# Patient Record
Sex: FEMALE | Race: OTHER | NOT HISPANIC OR LATINO | URBAN - METROPOLITAN AREA
[De-identification: names, ages, dates, MRNs, and addresses within clinical notes are randomized per-mention and may not be internally consistent; named-entity substitution may affect disease eponyms.]

---

## 2021-02-23 ENCOUNTER — OFFICE VISIT (OUTPATIENT)
Dept: URBAN - METROPOLITAN AREA CLINIC 7 | Facility: CLINIC | Age: 66
End: 2021-02-23

## 2021-03-13 ENCOUNTER — LAB OUTSIDE AN ENCOUNTER (OUTPATIENT)
Age: 66
End: 2021-03-13

## 2021-03-20 LAB
CLASS: 0
GLIADIN (DEAMIDATED) AB (IGG): (no result)
HELICOBACTER PYLORI, UREA BREATH TEST: NOT DETECTED
IMMUNOGLOBULIN A: (no result)
INTERPRETATION: (no result)
TISSUE TRANSGLUTAMINASE AB, IGA: (no result)
WHEAT (F4) IGE: (no result)

## 2021-03-29 ENCOUNTER — TELEPHONE ENCOUNTER (OUTPATIENT)
Dept: URBAN - METROPOLITAN AREA CLINIC 9 | Facility: CLINIC | Age: 66
End: 2021-03-29

## 2021-04-01 ENCOUNTER — TELEPHONE ENCOUNTER (OUTPATIENT)
Dept: URBAN - METROPOLITAN AREA CLINIC 9 | Facility: CLINIC | Age: 66
End: 2021-04-01

## 2021-04-19 ENCOUNTER — OFFICE VISIT (OUTPATIENT)
Dept: URBAN - METROPOLITAN AREA CLINIC 7 | Facility: CLINIC | Age: 66
End: 2021-04-19

## 2021-05-24 ENCOUNTER — OFFICE VISIT (OUTPATIENT)
Dept: URBAN - METROPOLITAN AREA CLINIC 7 | Facility: CLINIC | Age: 66
End: 2021-05-24

## 2022-04-07 ENCOUNTER — APPOINTMENT (RX ONLY)
Dept: URBAN - METROPOLITAN AREA CLINIC 148 | Facility: CLINIC | Age: 67
Setting detail: DERMATOLOGY
End: 2022-04-07

## 2022-04-07 DIAGNOSIS — L82.1 OTHER SEBORRHEIC KERATOSIS: ICD-10-CM

## 2022-04-07 DIAGNOSIS — L81.4 OTHER MELANIN HYPERPIGMENTATION: ICD-10-CM

## 2022-04-07 DIAGNOSIS — L72.0 EPIDERMAL CYST: ICD-10-CM

## 2022-04-07 PROCEDURE — ? COUNSELING

## 2022-04-07 PROCEDURE — ? BENIGN DESTRUCTION

## 2022-04-07 PROCEDURE — 99203 OFFICE O/P NEW LOW 30 MIN: CPT | Mod: 25

## 2022-04-07 PROCEDURE — ? ADDITIONAL NOTES

## 2022-04-07 PROCEDURE — ? SUNSCREEN RECOMMENDATIONS

## 2022-04-07 PROCEDURE — 17110 DESTRUCTION B9 LES UP TO 14: CPT

## 2022-04-07 ASSESSMENT — LOCATION SIMPLE DESCRIPTION DERM
LOCATION SIMPLE: LEFT FOREHEAD
LOCATION SIMPLE: LEFT SUPERIOR EYELID
LOCATION SIMPLE: RIGHT CHEEK

## 2022-04-07 ASSESSMENT — LOCATION DETAILED DESCRIPTION DERM
LOCATION DETAILED: LEFT MEDIAL SUPERIOR EYELID
LOCATION DETAILED: LEFT INFERIOR LATERAL FOREHEAD
LOCATION DETAILED: LEFT LATERAL FOREHEAD
LOCATION DETAILED: RIGHT SUPERIOR CENTRAL MALAR CHEEK

## 2022-04-07 ASSESSMENT — LOCATION ZONE DERM
LOCATION ZONE: EYELID
LOCATION ZONE: FACE

## 2022-04-07 NOTE — PROCEDURE: BENIGN DESTRUCTION
Treatment Number (Will Not Render If 0): 0
Render Post-Care Instructions In Note?: no
Medical Necessity Information: It is in your best interest to select a reason for this procedure from the list below. All of these items fulfill various CMS LCD requirements except the new and changing color options.
Anesthesia Volume In Cc: 0.5
Post-Care Instructions: I reviewed with the patient in detail post-care instructions. Patient is to wear sunprotection, and avoid picking at any of the treated lesions. Pt may apply Vaseline to crusted or scabbing areas.
Detail Level: Detailed
Medical Necessity Clause: This procedure was medically necessary because the lesions that were treated were:
Consent: The patient's consent was obtained including but not limited to risks of crusting, scabbing, blistering, scarring, darker or lighter pigmentary change, recurrence, incomplete removal and infection.

## 2022-07-09 ENCOUNTER — TELEPHONE ENCOUNTER (OUTPATIENT)
Dept: URBAN - METROPOLITAN AREA CLINIC 121 | Facility: CLINIC | Age: 67
End: 2022-07-09

## 2022-07-09 RX ORDER — OMEPRAZOLE 20 MG/1
TWICE A DAY CAPSULE, DELAYED RELEASE ORAL TWICE A DAY
Refills: 1 | OUTPATIENT
Start: 2015-11-04 | End: 2015-12-22

## 2022-07-09 RX ORDER — DICYCLOMINE HYDROCHLORIDE 10 MG/1
CAPSULE ORAL TWICE A DAY
Refills: 0 | OUTPATIENT
Start: 2015-11-04 | End: 2015-11-04

## 2022-07-09 RX ORDER — OMEPRAZOLE 20 MG/1
CAPSULE, DELAYED RELEASE ORAL TWICE A DAY
Refills: 0 | OUTPATIENT
Start: 2015-11-04 | End: 2015-11-04

## 2022-07-09 RX ORDER — DICYCLOMINE HYDROCHLORIDE 10 MG/1
TWICE A DAY CAPSULE ORAL TWICE A DAY
Refills: 1 | OUTPATIENT
Start: 2015-11-04 | End: 2015-12-22

## 2022-07-10 ENCOUNTER — TELEPHONE ENCOUNTER (OUTPATIENT)
Dept: URBAN - METROPOLITAN AREA CLINIC 121 | Facility: CLINIC | Age: 67
End: 2022-07-10

## 2022-07-10 RX ORDER — DICYCLOMINE HYDROCHLORIDE 10 MG/1
CAPSULE ORAL TWICE A DAY
Refills: 0 | Status: ACTIVE | COMMUNITY
Start: 2015-12-22

## 2022-07-10 RX ORDER — BUTALBITAL, ACETAMINOPHEN, AND CAFFEINE 50; 325; 40 MG/1; MG/1; MG/1
TABLET ORAL ONCE A DAY
Refills: 0 | Status: ACTIVE | COMMUNITY
Start: 2015-11-04

## 2022-07-10 RX ORDER — OMEPRAZOLE 20 MG/1
CAPSULE, DELAYED RELEASE ORAL TWICE A DAY
Refills: 0 | Status: ACTIVE | COMMUNITY
Start: 2015-12-22

## 2022-07-10 RX ORDER — FAMOTIDINE 10 MG
ONCE A DAY TABLET ORAL ONCE A DAY
Refills: 3 | Status: ACTIVE | COMMUNITY
Start: 2015-12-22

## 2022-07-30 ENCOUNTER — TELEPHONE ENCOUNTER (OUTPATIENT)
Age: 67
End: 2022-07-30

## 2022-07-31 ENCOUNTER — TELEPHONE ENCOUNTER (OUTPATIENT)
Age: 67
End: 2022-07-31

## 2022-07-31 RX ORDER — HYOSCYAMINE SULFATE 0.12 MG/1
1 (ONE) TABLET ORAL
Qty: 0 | Refills: 16 | Status: ACTIVE | COMMUNITY
Start: 2021-05-24

## 2022-07-31 RX ORDER — HYOSCYAMINE SULFATE 0.12 MG/1
1 (ONE) TABLET ORAL
Qty: 0 | Refills: 16 | Status: ACTIVE | COMMUNITY
Start: 2021-02-23

## 2023-01-25 ENCOUNTER — WEB ENCOUNTER (OUTPATIENT)
Dept: URBAN - METROPOLITAN AREA CLINIC 7 | Facility: CLINIC | Age: 68
End: 2023-01-25

## 2023-01-25 ENCOUNTER — OFFICE VISIT (OUTPATIENT)
Dept: URBAN - METROPOLITAN AREA CLINIC 7 | Facility: CLINIC | Age: 68
End: 2023-01-25
Payer: COMMERCIAL

## 2023-01-25 VITALS
BODY MASS INDEX: 29.41 KG/M2 | TEMPERATURE: 97.6 F | RESPIRATION RATE: 16 BRPM | SYSTOLIC BLOOD PRESSURE: 130 MMHG | HEIGHT: 63 IN | DIASTOLIC BLOOD PRESSURE: 70 MMHG | WEIGHT: 166 LBS

## 2023-01-25 DIAGNOSIS — Z87.19 HISTORY OF COLITIS: ICD-10-CM

## 2023-01-25 DIAGNOSIS — R10.30 LOWER ABDOMINAL PAIN: ICD-10-CM

## 2023-01-25 DIAGNOSIS — R14.0 BLOATING: ICD-10-CM

## 2023-01-25 DIAGNOSIS — K27.9 PEPTIC ULC, SITE UNSP, UNSP AS AC OR CHR, W/O HEMOR OR PERF: ICD-10-CM

## 2023-01-25 DIAGNOSIS — K58.9 IRRITABLE BOWEL SYNDROME WITHOUT DIARRHEA: ICD-10-CM

## 2023-01-25 DIAGNOSIS — Z12.11 SCREENING FOR COLON CANCER: ICD-10-CM

## 2023-01-25 DIAGNOSIS — K21.9 GERD (GASTROESOPHAGEAL REFLUX DISEASE): ICD-10-CM

## 2023-01-25 DIAGNOSIS — K57.90 DIVERTICULOSIS: ICD-10-CM

## 2023-01-25 PROCEDURE — 99214 OFFICE O/P EST MOD 30 MIN: CPT | Performed by: INTERNAL MEDICINE

## 2023-01-25 RX ORDER — HYOSCYAMINE SULFATE 0.12 MG/1
1 TABLET AS NEEDED TABLET ORAL
Qty: 60 | Refills: 3 | OUTPATIENT
Start: 2023-01-25 | End: 2023-05-25

## 2023-01-25 RX ORDER — FLUTICASONE PROPIONATE 50 UG/1
USE TWO SPRAYS IN EACH NOSTRIL AT BEDTIME SPRAY, METERED NASAL
Qty: 16 UNSPECIFIED | Refills: 0 | Status: ACTIVE | COMMUNITY

## 2023-01-25 RX ORDER — HYOSCYAMINE SULFATE 0.12 MG/1
1 (ONE) TABLET ORAL
Qty: 0 | Refills: 16 | Status: DISCONTINUED | COMMUNITY
Start: 2021-05-24

## 2023-01-25 RX ORDER — BUTALBITAL, ACETAMINOPHEN, AND CAFFEINE 50; 325; 40 MG/1; MG/1; MG/1
TABLET ORAL ONCE A DAY
Refills: 0 | Status: DISCONTINUED | COMMUNITY
Start: 2015-11-04

## 2023-01-25 RX ORDER — CYCLOBENZAPRINE HYDROCHLORIDE 10 MG/1
TAKE ONE TABLET BY MOUTH ONE TIME DAILY AT DINNER TABLET, FILM COATED ORAL
Qty: 90 UNSPECIFIED | Refills: 0 | Status: ACTIVE | COMMUNITY

## 2023-01-25 RX ORDER — FAMOTIDINE 10 MG
ONCE A DAY TABLET ORAL ONCE A DAY
Refills: 3 | Status: DISCONTINUED | COMMUNITY
Start: 2015-12-22

## 2023-01-25 RX ORDER — ESTRADIOL 0.05 MG/D
PATCH TRANSDERMAL
Qty: 12 EACH | Status: ACTIVE | COMMUNITY

## 2023-01-25 RX ORDER — DICYCLOMINE HYDROCHLORIDE 10 MG/1
CAPSULE ORAL TWICE A DAY
Refills: 0 | Status: DISCONTINUED | COMMUNITY
Start: 2015-12-22

## 2023-01-25 RX ORDER — OMEPRAZOLE 20 MG/1
CAPSULE, DELAYED RELEASE ORAL TWICE A DAY
Refills: 0 | Status: ACTIVE | COMMUNITY
Start: 2015-12-22

## 2023-01-25 RX ORDER — METRONIDAZOLE 500 MG/1
TABLET, FILM COATED ORAL
Qty: 20 TABLET | Status: ACTIVE | COMMUNITY

## 2023-01-25 RX ORDER — ATORVASTATIN CALCIUM 20 MG/1
TAKE ONE TABLET BY MOUTH ONE TIME DAILY TABLET, FILM COATED ORAL
Qty: 30 UNSPECIFIED | Refills: 6 | Status: ACTIVE | COMMUNITY

## 2023-01-25 RX ORDER — DICLOFENAC SODIUM 75 MG/1
TAKE ONE TABLET BY MOUTH TWICE A DAY WITH A MEAL TABLET, DELAYED RELEASE ORAL
Qty: 60 UNSPECIFIED | Refills: 1 | Status: ACTIVE | COMMUNITY

## 2023-01-25 RX ORDER — CIPROFLOXACIN HYDROCHLORIDE 500 MG/1
TAKE ONE TABLET BY MOUTH EVERY 12 HOURS WITH A MEAL FOR 10 DAYS TABLET, FILM COATED ORAL
Qty: 20 UNSPECIFIED | Refills: 0 | Status: ACTIVE | COMMUNITY

## 2023-01-25 NOTE — HPI-TODAY'S VISIT:
LV 5/2021. Eval was for years of lower abd pain, cramping, constipation/diarrhea, gas, distention. ER visit for lower abd pain in 2019 with CMP largely normal (slightly elevated AST), normal Hgb. CT in 2019 with colitis involving the descending colon, fatty liver, vasculature normal. Had flex sig done with diverticular disease, and internal hemorrhoids, bx negative.  Recent laboratory studies done at [facility] demonstrated normal CBC and normal iron studies and normal comprehensive metabolic panel. CT 3/6/21 with diverticulosis but no bowel inflammation. Celiac/wheat ab testing negative. Placed on bowel regimen and beano. Hpylori breath test negative. Treated with levsin as well. She was having 3-4 BM per day. Occasionally has nausea, issues with red meat. EGD/colon in 2018 with [facility]. No emesis. She still gets colic, but better now that she is going to the bathroom - cramping resolves with defecation, suggestive of IBS. Uses simethicone pretty frequently, and this relieves her. Overall, symptoms suggestive of IBS-C, better now that she is going more regularly. Will get food allergy testing and SIBO testing. Levsin prn, and possibly gluten free trial. Never did food allergy testing, no SIBO, and did not get records. FU now.  She is having problems since November 2022. Lots of pain, some diarrhea, vomiting, intolerances to everything she eats. Lots of abdominal cramping, similar to prior. Having reflux, regurgitation, heartburn, sluggish in the esophagus. On omeprazole, dicyclomine all the time. Bentyl doesnt help. Cycling diarrhea and constipation. Taking benefiber daily, no laxatives.

## 2023-01-26 PROBLEM — 10743008: Status: ACTIVE | Noted: 2023-01-26

## 2023-02-01 ENCOUNTER — LAB OUTSIDE AN ENCOUNTER (OUTPATIENT)
Dept: URBAN - METROPOLITAN AREA CLINIC 7 | Facility: CLINIC | Age: 68
End: 2023-02-01

## 2023-02-13 ENCOUNTER — CLAIMS CREATED FROM THE CLAIM WINDOW (OUTPATIENT)
Dept: URBAN - METROPOLITAN AREA SURGERY CENTER 5 | Facility: SURGERY CENTER | Age: 68
End: 2023-02-13
Payer: COMMERCIAL

## 2023-02-13 ENCOUNTER — CLAIMS CREATED FROM THE CLAIM WINDOW (OUTPATIENT)
Dept: URBAN - METROPOLITAN AREA CLINIC 4 | Facility: CLINIC | Age: 68
End: 2023-02-13
Payer: COMMERCIAL

## 2023-02-13 DIAGNOSIS — K31.89 ACQUIRED DEFORMITY OF DUODENUM: ICD-10-CM

## 2023-02-13 DIAGNOSIS — K22.89 DILATATION OF ESOPHAGUS: ICD-10-CM

## 2023-02-13 DIAGNOSIS — R13.10 ABNORMAL DEGLUTITION: ICD-10-CM

## 2023-02-13 DIAGNOSIS — Z12.11 COLON CANCER SCREENING: ICD-10-CM

## 2023-02-13 DIAGNOSIS — K64.4 ANAL SKIN TAG: ICD-10-CM

## 2023-02-13 DIAGNOSIS — K64.8 EXTERNAL HEMORRHOIDS: ICD-10-CM

## 2023-02-13 DIAGNOSIS — K44.9 DIAPHRAGMATIC HERNIA: ICD-10-CM

## 2023-02-13 DIAGNOSIS — Q43.8 CONGENITAL ANOMALY OF APPENDIX: ICD-10-CM

## 2023-02-13 DIAGNOSIS — K57.30 ACQUIRED DIVERTICULOSIS OF COLON: ICD-10-CM

## 2023-02-13 DIAGNOSIS — K31.89 OTHER DISEASES OF STOMACH AND DUODENUM: ICD-10-CM

## 2023-02-13 DIAGNOSIS — K63.89 OTHER SPECIFIED DISEASES OF INTESTINE: ICD-10-CM

## 2023-02-13 PROCEDURE — 88305 TISSUE EXAM BY PATHOLOGIST: CPT | Performed by: PATHOLOGY

## 2023-02-13 PROCEDURE — 43248 EGD GUIDE WIRE INSERTION: CPT | Performed by: INTERNAL MEDICINE

## 2023-02-13 PROCEDURE — 88342 IMHCHEM/IMCYTCHM 1ST ANTB: CPT | Performed by: PATHOLOGY

## 2023-02-13 PROCEDURE — 88313 SPECIAL STAINS GROUP 2: CPT | Performed by: PATHOLOGY

## 2023-02-13 PROCEDURE — 43239 EGD BIOPSY SINGLE/MULTIPLE: CPT | Performed by: INTERNAL MEDICINE

## 2023-02-13 PROCEDURE — 88312 SPECIAL STAINS GROUP 1: CPT | Performed by: PATHOLOGY

## 2023-02-13 PROCEDURE — 45380 COLONOSCOPY AND BIOPSY: CPT | Performed by: INTERNAL MEDICINE

## 2023-02-13 RX ORDER — ESTRADIOL 0.05 MG/D
PATCH TRANSDERMAL
Qty: 12 EACH | Status: ACTIVE | COMMUNITY

## 2023-02-13 RX ORDER — METRONIDAZOLE 500 MG/1
TABLET, FILM COATED ORAL
Qty: 20 TABLET | Status: ACTIVE | COMMUNITY

## 2023-02-13 RX ORDER — CIPROFLOXACIN HYDROCHLORIDE 500 MG/1
TAKE ONE TABLET BY MOUTH EVERY 12 HOURS WITH A MEAL FOR 10 DAYS TABLET, FILM COATED ORAL
Qty: 20 UNSPECIFIED | Refills: 0 | Status: ACTIVE | COMMUNITY

## 2023-02-13 RX ORDER — CYCLOBENZAPRINE HYDROCHLORIDE 10 MG/1
TAKE ONE TABLET BY MOUTH ONE TIME DAILY AT DINNER TABLET, FILM COATED ORAL
Qty: 90 UNSPECIFIED | Refills: 0 | Status: ACTIVE | COMMUNITY

## 2023-02-13 RX ORDER — ATORVASTATIN CALCIUM 20 MG/1
TAKE ONE TABLET BY MOUTH ONE TIME DAILY TABLET, FILM COATED ORAL
Qty: 30 UNSPECIFIED | Refills: 6 | Status: ACTIVE | COMMUNITY

## 2023-02-13 RX ORDER — FLUTICASONE PROPIONATE 50 UG/1
USE TWO SPRAYS IN EACH NOSTRIL AT BEDTIME SPRAY, METERED NASAL
Qty: 16 UNSPECIFIED | Refills: 0 | Status: ACTIVE | COMMUNITY

## 2023-02-13 RX ORDER — HYOSCYAMINE SULFATE 0.12 MG/1
1 TABLET AS NEEDED TABLET ORAL
Qty: 60 | Refills: 3 | Status: ACTIVE | COMMUNITY
Start: 2023-01-25 | End: 2023-05-25

## 2023-02-13 RX ORDER — OMEPRAZOLE 20 MG/1
CAPSULE, DELAYED RELEASE ORAL TWICE A DAY
Refills: 0 | Status: ACTIVE | COMMUNITY
Start: 2015-12-22

## 2023-02-13 RX ORDER — DICLOFENAC SODIUM 75 MG/1
TAKE ONE TABLET BY MOUTH TWICE A DAY WITH A MEAL TABLET, DELAYED RELEASE ORAL
Qty: 60 UNSPECIFIED | Refills: 1 | Status: ACTIVE | COMMUNITY

## 2023-05-17 PROBLEM — Z00.00 ENCOUNTER FOR PREVENTIVE HEALTH EXAMINATION: Status: ACTIVE | Noted: 2023-05-17

## 2023-05-22 ENCOUNTER — APPOINTMENT (OUTPATIENT)
Dept: OBGYN | Facility: CLINIC | Age: 68
End: 2023-05-22
Payer: COMMERCIAL

## 2023-05-22 VITALS
BODY MASS INDEX: 29.06 KG/M2 | WEIGHT: 164 LBS | HEIGHT: 63 IN | SYSTOLIC BLOOD PRESSURE: 144 MMHG | DIASTOLIC BLOOD PRESSURE: 84 MMHG

## 2023-05-22 DIAGNOSIS — N81.9 FEMALE GENITAL PROLAPSE, UNSPECIFIED: ICD-10-CM

## 2023-05-22 DIAGNOSIS — M19.90 UNSPECIFIED OSTEOARTHRITIS, UNSPECIFIED SITE: ICD-10-CM

## 2023-05-22 DIAGNOSIS — E78.00 PURE HYPERCHOLESTEROLEMIA, UNSPECIFIED: ICD-10-CM

## 2023-05-22 DIAGNOSIS — N39.41 URGE INCONTINENCE: ICD-10-CM

## 2023-05-22 PROCEDURE — 99204 OFFICE O/P NEW MOD 45 MIN: CPT

## 2023-05-22 RX ORDER — IRON FUM,PS/FOLIC/BCOMP,C NO.9 125 MG-1MG
CAPSULE ORAL
Refills: 0 | Status: ACTIVE | COMMUNITY

## 2023-05-22 RX ORDER — CYCLOBENZAPRINE HYDROCHLORIDE 7.5 MG/1
TABLET, FILM COATED ORAL
Refills: 0 | Status: ACTIVE | COMMUNITY

## 2023-05-22 RX ORDER — ATORVASTATIN CALCIUM 80 MG/1
TABLET, FILM COATED ORAL
Refills: 0 | Status: ACTIVE | COMMUNITY

## 2023-05-22 RX ORDER — DARIFENACIN HYDROBROMIDE 7.5 MG/1
7.5 TABLET, EXTENDED RELEASE ORAL
Qty: 1 | Refills: 0 | Status: ACTIVE | COMMUNITY
Start: 2023-05-22

## 2023-05-22 NOTE — HISTORY OF PRESENT ILLNESS
[FreeTextEntry1] : pt comes for surgery . She has all of her vagina coming out . It has gotten worse in the last two years . She has voiding difficulties as well as defecation .

## 2023-05-22 NOTE — PHYSICAL EXAM
[Examination Of The Breasts] : a normal appearance [No Masses] : no breast masses were palpable [Labia Majora] : normal [Labia Minora] : normal [Normal] : normal [Atrophy] : atrophy

## 2023-07-20 ENCOUNTER — OFFICE VISIT (OUTPATIENT)
Dept: URBAN - METROPOLITAN AREA CLINIC 7 | Facility: CLINIC | Age: 68
End: 2023-07-20
Payer: COMMERCIAL

## 2023-07-20 VITALS
SYSTOLIC BLOOD PRESSURE: 174 MMHG | HEART RATE: 88 BPM | BODY MASS INDEX: 29.77 KG/M2 | TEMPERATURE: 97.8 F | WEIGHT: 168 LBS | HEIGHT: 63 IN | DIASTOLIC BLOOD PRESSURE: 100 MMHG

## 2023-07-20 DIAGNOSIS — R10.30 LOWER ABDOMINAL PAIN: ICD-10-CM

## 2023-07-20 DIAGNOSIS — K64.8 INTERNAL HEMORRHOIDS WITHOUT COMPLICATION: ICD-10-CM

## 2023-07-20 DIAGNOSIS — K58.9 IRRITABLE BOWEL SYNDROME WITHOUT DIARRHEA: ICD-10-CM

## 2023-07-20 DIAGNOSIS — Z87.19 HISTORY OF COLITIS: ICD-10-CM

## 2023-07-20 DIAGNOSIS — K21.9 GERD (GASTROESOPHAGEAL REFLUX DISEASE): ICD-10-CM

## 2023-07-20 DIAGNOSIS — R14.0 BLOATING: ICD-10-CM

## 2023-07-20 DIAGNOSIS — K57.90 DIVERTICULOSIS: ICD-10-CM

## 2023-07-20 PROCEDURE — 99213 OFFICE O/P EST LOW 20 MIN: CPT | Performed by: INTERNAL MEDICINE

## 2023-07-20 RX ORDER — ESTRADIOL 0.05 MG/D
PATCH TRANSDERMAL
Qty: 12 EACH | Status: ACTIVE | COMMUNITY

## 2023-07-20 RX ORDER — FLUTICASONE PROPIONATE 50 UG/1
USE TWO SPRAYS IN EACH NOSTRIL AT BEDTIME SPRAY, METERED NASAL
Qty: 16 UNSPECIFIED | Refills: 0 | Status: DISCONTINUED | COMMUNITY

## 2023-07-20 RX ORDER — ATORVASTATIN CALCIUM 20 MG/1
TAKE ONE TABLET BY MOUTH ONE TIME DAILY TABLET, FILM COATED ORAL
Qty: 30 UNSPECIFIED | Refills: 6 | Status: ACTIVE | COMMUNITY

## 2023-07-20 RX ORDER — CYCLOBENZAPRINE HYDROCHLORIDE 10 MG/1
TAKE ONE TABLET BY MOUTH ONE TIME DAILY AT DINNER TABLET, FILM COATED ORAL
Qty: 90 UNSPECIFIED | Refills: 0 | Status: DISCONTINUED | COMMUNITY

## 2023-07-20 RX ORDER — DICLOFENAC SODIUM 75 MG/1
TAKE ONE TABLET BY MOUTH TWICE A DAY WITH A MEAL TABLET, DELAYED RELEASE ORAL
Qty: 60 UNSPECIFIED | Refills: 1 | Status: DISCONTINUED | COMMUNITY

## 2023-07-20 RX ORDER — METRONIDAZOLE 500 MG/1
TABLET, FILM COATED ORAL
Qty: 20 TABLET | Status: DISCONTINUED | COMMUNITY

## 2023-07-20 RX ORDER — OMEPRAZOLE 20 MG/1
CAPSULE, DELAYED RELEASE ORAL TWICE A DAY
Refills: 0 | Status: DISCONTINUED | COMMUNITY
Start: 2015-12-22

## 2023-07-20 RX ORDER — CIPROFLOXACIN HYDROCHLORIDE 500 MG/1
TAKE ONE TABLET BY MOUTH EVERY 12 HOURS WITH A MEAL FOR 10 DAYS TABLET, FILM COATED ORAL
Qty: 20 UNSPECIFIED | Refills: 0 | Status: DISCONTINUED | COMMUNITY

## 2023-07-20 NOTE — HPI-TODAY'S VISIT:
LV 1/2023. Eval was for years of lower abd pain, cramping, constipation/diarrhea, gas, distention. ER visit for lower abd pain in 2019 with CMP largely normal (slightly elevated AST), normal Hgb. CT in 2019 with colitis involving the descending colon, fatty liver, vasculature normal. Had flex sig done with diverticular disease, and internal hemorrhoids, bx negative.  Recent laboratory studies done at [facility] demonstrated normal CBC and normal iron studies and normal comprehensive metabolic panel. CT 3/6/21 with diverticulosis but no bowel inflammation. Celiac/wheat ab testing negative. Placed on bowel regimen and beano. Hpylori breath test negative. Treated with levsin as well. She was having 3-4 BM per day. Occasionally had nausea, issues with red meat. EGD/colon in 2018 with [facility]. No emesis. Was still getting colic, but better now that she is going to the bathroom - cramping resolves with defecation, suggestive of IBS. Uses simethicone pretty frequently, and this relieves her. Overall, symptoms suggestive of IBS-C, better now that she is going more regularly. Wanted to get food allergy testing and SIBO testing. Levsin prn, and possibly gluten free trial. Never did food allergy testing, no SIBO, and did not get records. LV, she was having problems since November 2022. Lots of pain, some diarrhea, vomiting, intolerances to everything she eats. Lots of abdominal cramping, similar to prior. Having reflux, regurgitation, heartburn, sluggish in the esophagus. On omeprazole, dicyclomine all the time. Bentyl didnt help. Cycling diarrhea and constipation. Taking benefiber daily, no laxatives.  Colonoscopy February 2023 demonstrated perianal skin tags, tortuous left colon, mild to moderate diverticulosis sigmoid and distal descending, normal terminal ileum, small nonbleeding internal hemorrhoids with otherwise normal exam and pathology demonstrating no inflammatory changes.  EGD February 2023 demonstrating a small 2 cm hernia, variable Z-line, erythema in the antrum, and otherwise normal duodenum with biopsies negative for celiac disease negative for H. pylori and negative for Rocha's esophagus.  I advised that she continue with MiraLAX and Benefiber as well as Levsin as needed for abdominal cramping.  Advised to follow-up if symptoms were to recur or persist.  She is going better to the bathroom, taking papaya, Benefiber as well. Doing better from this regard. She is not having much colick at this point, so much better now. Felt a little ball in her anus a few weeks ago. Otherwise improved.

## 2023-07-26 ENCOUNTER — OUTPATIENT (OUTPATIENT)
Dept: OUTPATIENT SERVICES | Facility: HOSPITAL | Age: 68
LOS: 1 days | End: 2023-07-26
Payer: COMMERCIAL

## 2023-07-26 VITALS
TEMPERATURE: 97 F | SYSTOLIC BLOOD PRESSURE: 162 MMHG | WEIGHT: 166.89 LBS | DIASTOLIC BLOOD PRESSURE: 97 MMHG | RESPIRATION RATE: 18 BRPM | HEIGHT: 63 IN | OXYGEN SATURATION: 98 % | HEART RATE: 82 BPM

## 2023-07-26 VITALS
HEART RATE: 82 BPM | WEIGHT: 166.89 LBS | SYSTOLIC BLOOD PRESSURE: 168 MMHG | HEIGHT: 63 IN | DIASTOLIC BLOOD PRESSURE: 82 MMHG | TEMPERATURE: 97 F | OXYGEN SATURATION: 100 % | RESPIRATION RATE: 18 BRPM

## 2023-07-26 DIAGNOSIS — E78.5 HYPERLIPIDEMIA, UNSPECIFIED: ICD-10-CM

## 2023-07-26 DIAGNOSIS — N81.9 FEMALE GENITAL PROLAPSE, UNSPECIFIED: ICD-10-CM

## 2023-07-26 DIAGNOSIS — Z01.818 ENCOUNTER FOR OTHER PREPROCEDURAL EXAMINATION: ICD-10-CM

## 2023-07-26 DIAGNOSIS — R03.0 ELEVATED BLOOD-PRESSURE READING, WITHOUT DIAGNOSIS OF HYPERTENSION: ICD-10-CM

## 2023-07-26 DIAGNOSIS — Z86.2 PERSONAL HISTORY OF DISEASES OF THE BLOOD AND BLOOD-FORMING ORGANS AND CERTAIN DISORDERS INVOLVING THE IMMUNE MECHANISM: ICD-10-CM

## 2023-07-26 LAB — BLD GP AB SCN SERPL QL: SIGNIFICANT CHANGE UP

## 2023-07-26 NOTE — H&P PST ADULT - PROBLEM SELECTOR PLAN 1
Patient scheduled for robotic assisted laparoscopic sacral colpopexy with sling placement on 8/1/23. Preop instructions given both verbal and written,  instructed to be NPO the night before and the morning of surgery. Provided with chlorhexidine 4% solution to wash for 3 days including the morning of surgery, bottle provided .Instructed to avoid aspirin and over the counter medications including vitamins and herbal medications one week before surgery. Patient verbalized understanding. Escort required  Stop bang score is 1 , patient low risk for SUGEY.  T&S collected here today.  Patient medically optimized, report obtained.

## 2023-07-26 NOTE — H&P PST ADULT - HISTORY OF PRESENT ILLNESS
67 years old female with PMH of HLD, IGF, Thrombocytosis presents to [facility] today for presurgical evaluation. patient with vaginal prolapse scheduled for robotic assisted laparoscopic sacral colpopexy with sling placement on 8/1/23.

## 2023-07-26 NOTE — H&P PST ADULT - ASSESSMENT
67 years old female with PMH of HLD, IGF, Thrombocytosis presents to [facility] today for presurgical evaluation. patient with vaginal prolapse scheduled for robotic assisted laparoscopic sacral colpopexy with sling placement on 8/1/23.

## 2023-07-26 NOTE — H&P PST ADULT - HISTORY OF PRESENT ILLNESS
67 years old female with PMH of HLD presents to [facility] today for presurgical evaluation. Patient scheduled for robotic assisted laparoscopic sacral colpepe

## 2023-07-26 NOTE — H&P PST ADULT - NSANTHOSAYNRD_GEN_A_CORE
No. SUGEY screening performed.  STOP BANG Legend: 0-2 = LOW Risk; 3-4 = INTERMEDIATE Risk; 5-8 = HIGH Risk

## 2023-07-27 PROCEDURE — G0463: CPT

## 2023-07-31 ENCOUNTER — TRANSCRIPTION ENCOUNTER (OUTPATIENT)
Age: 68
End: 2023-07-31

## 2023-08-01 ENCOUNTER — APPOINTMENT (OUTPATIENT)
Dept: OBGYN | Facility: HOSPITAL | Age: 68
End: 2023-08-01

## 2023-08-01 ENCOUNTER — TRANSCRIPTION ENCOUNTER (OUTPATIENT)
Age: 68
End: 2023-08-01

## 2023-08-01 ENCOUNTER — OUTPATIENT (OUTPATIENT)
Dept: OUTPATIENT SERVICES | Facility: HOSPITAL | Age: 68
LOS: 1 days | End: 2023-08-01
Payer: COMMERCIAL

## 2023-08-01 VITALS
RESPIRATION RATE: 16 BRPM | OXYGEN SATURATION: 100 % | TEMPERATURE: 98 F | SYSTOLIC BLOOD PRESSURE: 128 MMHG | HEART RATE: 75 BPM | DIASTOLIC BLOOD PRESSURE: 61 MMHG

## 2023-08-01 DIAGNOSIS — Z96.653 PRESENCE OF ARTIFICIAL KNEE JOINT, BILATERAL: Chronic | ICD-10-CM

## 2023-08-01 DIAGNOSIS — N81.9 FEMALE GENITAL PROLAPSE, UNSPECIFIED: ICD-10-CM

## 2023-08-01 DIAGNOSIS — Z90.710 ACQUIRED ABSENCE OF BOTH CERVIX AND UTERUS: Chronic | ICD-10-CM

## 2023-08-01 LAB — BLD GP AB SCN SERPL QL: SIGNIFICANT CHANGE UP

## 2023-08-01 PROCEDURE — 57288 REPAIR BLADDER DEFECT: CPT

## 2023-08-01 PROCEDURE — S2900: CPT

## 2023-08-01 PROCEDURE — 57425 LAPAROSCOPY SURG COLPOPEXY: CPT | Mod: AS

## 2023-08-01 PROCEDURE — 57425 LAPAROSCOPY SURG COLPOPEXY: CPT

## 2023-08-01 PROCEDURE — C1771: CPT

## 2023-08-01 PROCEDURE — 86900 BLOOD TYPING SEROLOGIC ABO: CPT

## 2023-08-01 PROCEDURE — 86850 RBC ANTIBODY SCREEN: CPT

## 2023-08-01 PROCEDURE — 36415 COLL VENOUS BLD VENIPUNCTURE: CPT

## 2023-08-01 PROCEDURE — 86901 BLOOD TYPING SEROLOGIC RH(D): CPT

## 2023-08-01 PROCEDURE — C1781: CPT

## 2023-08-01 DEVICE — SLING MID-URETHRAL OBTRYX II HALO: Type: IMPLANTABLE DEVICE | Status: FUNCTIONAL

## 2023-08-01 DEVICE — MESH UPSYLON Y: Type: IMPLANTABLE DEVICE | Status: FUNCTIONAL

## 2023-08-01 RX ORDER — SODIUM CHLORIDE 9 MG/ML
3 INJECTION INTRAMUSCULAR; INTRAVENOUS; SUBCUTANEOUS EVERY 8 HOURS
Refills: 0 | Status: DISCONTINUED | OUTPATIENT
Start: 2023-08-01 | End: 2023-08-01

## 2023-08-01 RX ORDER — IBUPROFEN 200 MG
1 TABLET ORAL
Qty: 30 | Refills: 0
Start: 2023-08-01

## 2023-08-01 RX ORDER — ACETAMINOPHEN 500 MG
2 TABLET ORAL
Qty: 30 | Refills: 0
Start: 2023-08-01

## 2023-08-01 RX ORDER — SIMETHICONE 80 MG/1
1 TABLET, CHEWABLE ORAL
Qty: 20 | Refills: 0
Start: 2023-08-01

## 2023-08-01 RX ORDER — ATORVASTATIN CALCIUM 80 MG/1
1 TABLET, FILM COATED ORAL
Refills: 0 | DISCHARGE

## 2023-08-01 RX ORDER — DOCUSATE SODIUM 100 MG
1 CAPSULE ORAL
Qty: 30 | Refills: 0
Start: 2023-08-01

## 2023-08-01 RX ORDER — IBUPROFEN 200 MG
600 TABLET ORAL EVERY 6 HOURS
Refills: 0 | Status: DISCONTINUED | OUTPATIENT
Start: 2023-08-01 | End: 2023-08-15

## 2023-08-01 RX ORDER — CIPROFLOXACIN LACTATE 400MG/40ML
1 VIAL (ML) INTRAVENOUS
Qty: 6 | Refills: 0
Start: 2023-08-01

## 2023-08-01 RX ORDER — FENTANYL CITRATE 50 UG/ML
25 INJECTION INTRAVENOUS
Refills: 0 | Status: DISCONTINUED | OUTPATIENT
Start: 2023-08-01 | End: 2023-08-01

## 2023-08-01 RX ORDER — FENTANYL CITRATE 50 UG/ML
50 INJECTION INTRAVENOUS
Refills: 0 | Status: DISCONTINUED | OUTPATIENT
Start: 2023-08-01 | End: 2023-08-01

## 2023-08-01 RX ORDER — CYCLOBENZAPRINE HYDROCHLORIDE 10 MG/1
1 TABLET, FILM COATED ORAL
Refills: 0 | DISCHARGE

## 2023-08-01 NOTE — ASU DISCHARGE PLAN (ADULT/PEDIATRIC) - NS MD DC FALL RISK RISK
For information on Fall & Injury Prevention, visit: [email or web address] OR  [email or web address] OR  https://www.cdc.gov/steadi/patient.html

## 2023-08-01 NOTE — ASU DISCHARGE PLAN (ADULT/PEDIATRIC) - CARE PROVIDER_API CALL
Joaquin Ureña  Obstetrics and Gynecology  [address], [place] [address]  Phone: [phone]  Fax: [phone]  Scheduled Appointment: 08/03/2023 10:00 AM

## 2023-08-01 NOTE — ASU PREOP CHECKLIST - CHLOROHEXIDINE WASH 2
Vitaly Daly is a 71 y.o. male.   Pt presents today with CC of Abscess (fwp on abscess tooth)      History of Present Illness   Patient is a 71-year-old male here to follow-up on dental infection.  He has an upper left canine that is broken off at the gumline, the gum was swollen previously.  He was placed on Augmentin.  He is here today to follow-up.  He says the swelling is gone down, he still denies no fevers, no lymphadenopathy, no difficulty swallowing, no loss of smell.  He is doing well.       The following portions of the patient's history were reviewed and updated as appropriate: allergies, current medications, past family history, past medical history, past social history, past surgical history and problem list.    Review of Systems   Constitutional: Negative for chills, fever and unexpected weight loss.   HENT: Negative for congestion and sore throat.    Eyes: Negative for blurred vision and visual disturbance.   Respiratory: Negative for cough and wheezing.    Cardiovascular: Negative for chest pain and palpitations.   Gastrointestinal: Negative for abdominal pain and diarrhea.   Endocrine: Negative for cold intolerance and heat intolerance.   Genitourinary: Negative for dysuria.   Musculoskeletal: Negative for arthralgias and neck stiffness.   Neurological: Negative for dizziness, seizures and syncope.   Psychiatric/Behavioral: Negative for self-injury, suicidal ideas and depressed mood.       Objective   Physical Exam  Vitals signs and nursing note reviewed.   Constitutional:       Appearance: He is well-developed.   HENT:      Head: Normocephalic and atraumatic.      Right Ear: External ear normal.      Left Ear: External ear normal.      Nose: Nose normal.   Eyes:      Conjunctiva/sclera: Conjunctivae normal.      Pupils: Pupils are equal, round, and reactive to light.   Neck:      Musculoskeletal: Normal range of motion and neck supple.   Cardiovascular:      Rate and Rhythm: Normal  rate and regular rhythm.      Heart sounds: Normal heart sounds.   Pulmonary:      Effort: Pulmonary effort is normal.      Breath sounds: Normal breath sounds.   Abdominal:      General: Bowel sounds are normal.      Palpations: Abdomen is soft.   Skin:     General: Skin is warm and dry.   Neurological:      Mental Status: He is alert and oriented to person, place, and time.   Psychiatric:         Behavior: Behavior normal.           Assessment/Plan   Diagnoses and all orders for this visit:    1. Dental infection (Primary)  Continue Augmentin till end of the course.  His gum looks essentially normal at this point.  No abscess.    2. Poor dentition  He will still need to speak with a dental surgeon to have this tooth removed.  It is chronically infected and definitive treatment will be removal.               Elkin Daly  reports that he has been smoking cigarettes. He has a 135.00 pack-year smoking history. He has never used smokeless tobacco.. I have educated him on the risk of diseases from using tobacco products such as cancer, COPD and heart disease.     I advised him to quit and he is not willing to quit.    I spent 3  minutes counseling the patient.         Patient's Body mass index is 27.41 kg/m². BMI is above normal parameters. Recommendations include: exercise counseling and nutrition counseling.   31-Jul-2023 20:00

## 2023-08-01 NOTE — ASU DISCHARGE PLAN (ADULT/PEDIATRIC) - ACTIVITY LEVEL
No exercise/No heavy lifting/No sports/gym/Nothing per rectum/Nothing per vagina/No tub baths/No douching/No tampons/No intercourse

## 2023-08-03 ENCOUNTER — APPOINTMENT (OUTPATIENT)
Dept: OBGYN | Facility: CLINIC | Age: 68
End: 2023-08-03
Payer: COMMERCIAL

## 2023-08-03 PROBLEM — Z86.2 PERSONAL HISTORY OF DISEASES OF THE BLOOD AND BLOOD-FORMING ORGANS AND CERTAIN DISORDERS INVOLVING THE IMMUNE MECHANISM: Chronic | Status: ACTIVE | Noted: 2023-07-26

## 2023-08-03 PROBLEM — R73.01 IMPAIRED FASTING GLUCOSE: Chronic | Status: ACTIVE | Noted: 2023-07-26

## 2023-08-03 PROBLEM — E78.5 HYPERLIPIDEMIA, UNSPECIFIED: Chronic | Status: ACTIVE | Noted: 2023-07-26

## 2023-08-03 PROCEDURE — XXXXX: CPT | Mod: 1L

## 2023-08-16 ENCOUNTER — APPOINTMENT (OUTPATIENT)
Dept: OBGYN | Facility: CLINIC | Age: 68
End: 2023-08-16
Payer: COMMERCIAL

## 2023-08-16 VITALS — WEIGHT: 164 LBS | DIASTOLIC BLOOD PRESSURE: 81 MMHG | BODY MASS INDEX: 29.05 KG/M2 | SYSTOLIC BLOOD PRESSURE: 147 MMHG

## 2023-08-16 DIAGNOSIS — N76.2 ACUTE VULVITIS: ICD-10-CM

## 2023-08-16 PROCEDURE — 99024 POSTOP FOLLOW-UP VISIT: CPT

## 2023-08-16 RX ORDER — FLUCONAZOLE 150 MG/1
150 TABLET ORAL
Qty: 1 | Refills: 0 | Status: ACTIVE | COMMUNITY
Start: 2023-08-16

## 2023-08-16 NOTE — HISTORY OF PRESENT ILLNESS
[Pain is well-controlled] : pain is well-controlled [Fever] : no fever [Chills] : no chills [Nausea] : no nausea [Vomiting] : no vomiting [Diarrhea] : no diarrhea [Vaginal Bleeding] : no vaginal bleeding [Pelvic Pressure] : no pelvic pressure [Dysuria] : no dysuria [Vaginal Discharge] : vaginal discharge [Constipation] : no constipation [Clean/Dry/Intact] : clean, dry and intact [Erythema] : not erythematous [None] : no vaginal bleeding [Normal] : normal [Pathology reviewed] : pathology reviewed [de-identified] : feels good voiding well .  [de-identified] : sutures removed .

## 2023-08-21 RX ORDER — CLOTRIMAZOLE AND BETAMETHASONE DIPROPIONATE 10; .5 MG/ML; MG/ML
1-0.05 LOTION TOPICAL TWICE DAILY
Qty: 1 | Refills: 3 | Status: ACTIVE | COMMUNITY
Start: 2023-08-16 | End: 1900-01-01

## 2023-09-13 ENCOUNTER — APPOINTMENT (OUTPATIENT)
Dept: OBGYN | Facility: CLINIC | Age: 68
End: 2023-09-13
Payer: COMMERCIAL

## 2023-09-13 VITALS — DIASTOLIC BLOOD PRESSURE: 82 MMHG | WEIGHT: 167 LBS | SYSTOLIC BLOOD PRESSURE: 150 MMHG | BODY MASS INDEX: 29.58 KG/M2

## 2023-09-13 PROCEDURE — 99024 POSTOP FOLLOW-UP VISIT: CPT

## 2023-09-13 PROCEDURE — 99213 OFFICE O/P EST LOW 20 MIN: CPT | Mod: 25

## 2023-09-13 RX ORDER — DARIFENACIN HYDROBROMIDE 7.5 MG/1
7.5 TABLET, EXTENDED RELEASE ORAL
Qty: 1 | Refills: 0 | Status: ACTIVE | COMMUNITY
Start: 2023-09-13 | End: 1900-01-01

## 2024-01-28 PROBLEM — 95546008: Status: ACTIVE | Noted: 2024-01-28

## 2024-01-29 ENCOUNTER — OFFICE VISIT (OUTPATIENT)
Dept: URBAN - METROPOLITAN AREA CLINIC 7 | Facility: CLINIC | Age: 69
End: 2024-01-29
Payer: COMMERCIAL

## 2024-01-29 VITALS
HEIGHT: 63 IN | TEMPERATURE: 97.7 F | DIASTOLIC BLOOD PRESSURE: 80 MMHG | RESPIRATION RATE: 16 BRPM | BODY MASS INDEX: 29.77 KG/M2 | WEIGHT: 168 LBS | SYSTOLIC BLOOD PRESSURE: 130 MMHG

## 2024-01-29 DIAGNOSIS — R10.30 LOWER ABDOMINAL PAIN: ICD-10-CM

## 2024-01-29 DIAGNOSIS — K64.8 INTERNAL HEMORRHOIDS WITHOUT COMPLICATION: ICD-10-CM

## 2024-01-29 DIAGNOSIS — Z87.19 HISTORY OF COLITIS: ICD-10-CM

## 2024-01-29 DIAGNOSIS — R14.0 BLOATING: ICD-10-CM

## 2024-01-29 DIAGNOSIS — K21.9 GERD (GASTROESOPHAGEAL REFLUX DISEASE): ICD-10-CM

## 2024-01-29 DIAGNOSIS — K64.4 SKIN TAG OF PERIANAL REGION: ICD-10-CM

## 2024-01-29 DIAGNOSIS — K57.90 DIVERTICULOSIS: ICD-10-CM

## 2024-01-29 DIAGNOSIS — K58.9 IRRITABLE BOWEL SYNDROME WITHOUT DIARRHEA: ICD-10-CM

## 2024-01-29 PROCEDURE — 99214 OFFICE O/P EST MOD 30 MIN: CPT | Performed by: INTERNAL MEDICINE

## 2024-01-29 RX ORDER — ESTRADIOL 0.05 MG/D
PATCH TRANSDERMAL
Qty: 12 EACH | Status: DISCONTINUED | COMMUNITY

## 2024-01-29 RX ORDER — FAMOTIDINE 40 MG/1
1 TABLET AT BEDTIME TABLET, FILM COATED ORAL ONCE A DAY
Qty: 30 | Refills: 3 | OUTPATIENT
Start: 2024-01-29

## 2024-01-29 RX ORDER — ATORVASTATIN CALCIUM 20 MG/1
TAKE ONE TABLET BY MOUTH ONE TIME DAILY TABLET, FILM COATED ORAL
Qty: 30 UNSPECIFIED | Refills: 6 | Status: ACTIVE | COMMUNITY

## 2024-01-29 RX ORDER — HYDROCORTISONE ACETATE 30 MG/1
1 SUPPOSITORY SUPPOSITORY RECTAL TWICE A DAY
Qty: 20 | Refills: 1 | OUTPATIENT
Start: 2024-01-29 | End: 2024-02-18

## 2024-01-29 RX ORDER — ESTRADIOL 0.05 MG/D
1 PATCH TO SKIN PATCH TRANSDERMAL
Status: ACTIVE | COMMUNITY

## 2024-01-29 NOTE — HPI-TODAY'S VISIT:
LV 7/2023. Eval was for years of lower abd pain, cramping, constipation/diarrhea, gas, distention. ER visit for lower abd pain in 2019 with CMP largely normal (slightly elevated AST), normal Hgb. CT in 2019 with colitis involving the descending colon, fatty liver, vasculature normal. Had flex sig done with diverticular disease, and internal hemorrhoids, bx negative.  Recent laboratory studies done at [facility] demonstrated normal CBC and normal iron studies and normal comprehensive metabolic panel. CT 3/6/21 with diverticulosis but no bowel inflammation. Celiac/wheat ab testing negative. Placed on bowel regimen and beano. Hpylori breath test negative. Treated with levsin as well. She was having 3-4 BM per day. Occasionally had nausea, issues with red meat. EGD/colon in 2018 with [facility]. No emesis. Was still getting colic, but better now that she is going to the bathroom - cramping resolves with defecation, suggestive of IBS. Uses simethicone pretty frequently, and this relieves her. Overall, symptoms suggestive of IBS-C, better now that she is going more regularly. Wanted to get food allergy testing and SIBO testing. Levsin prn, and possibly gluten free trial. Never did food allergy testing, no SIBO, and did not get records. She was having problems since November 2022. Lots of pain, some diarrhea, vomiting, intolerances to everything she eats. Lots of abdominal cramping, similar to prior. Having reflux, regurgitation, heartburn, sluggish in the esophagus. On omeprazole, dicyclomine all the time. Bentyl didnt help. Cycling diarrhea and constipation. Taking benefiber daily, no laxatives. Colonoscopy February 2023 demonstrated perianal skin tags, tortuous left colon, mild to moderate diverticulosis sigmoid and distal descending, normal terminal ileum, small nonbleeding internal hemorrhoids with otherwise normal exam and pathology demonstrating no inflammatory changes.  EGD February 2023 demonstrating a small 2 cm hernia, variable Z-line, erythema in the antrum, and otherwise normal duodenum with biopsies negative for celiac disease negative for H. pylori and negative for Rocha's esophagus.  I advised that she continue with MiraLAX and Benefiber as well as Levsin as needed for abdominal cramping.  Advised to follow-up if symptoms were to recur or persist. LV, she was going better to the bathroom, taking papaya, Benefiber as well. Doing better from this regard. She is not having much colic at this point, so much better now. She felt a little ball in her anus a few weeks ago. Otherwise improved.  At last visit, I did appear that her abnormal sensation in the perianal area was related to skin tags which is pretty large.  I advised colorectal surgery referral to get that removed.  She mentioned to me that she was having a surgery in [place] for vaginal prolapse and so was going to get this taken care of and see me in follow-up. She did have bladder prolapse surgery in October 2023. No skin tags removed. Still has some frequency as far as urine but at night, not much during the day. This is being treated by urology. She does take benefiber and fruits and goes with this, but with straining. She did have BM with bleeding recently. Treated with vaseline. This resolved.

## 2024-04-03 ENCOUNTER — OV EP (OUTPATIENT)
Dept: URBAN - METROPOLITAN AREA CLINIC 7 | Facility: CLINIC | Age: 69
End: 2024-04-03
Payer: COMMERCIAL

## 2024-04-03 VITALS
WEIGHT: 165 LBS | HEIGHT: 63 IN | DIASTOLIC BLOOD PRESSURE: 62 MMHG | SYSTOLIC BLOOD PRESSURE: 118 MMHG | TEMPERATURE: 97 F | BODY MASS INDEX: 29.23 KG/M2

## 2024-04-03 DIAGNOSIS — K21.9 GERD (GASTROESOPHAGEAL REFLUX DISEASE): ICD-10-CM

## 2024-04-03 DIAGNOSIS — K64.8 INTERNAL HEMORRHOIDS WITHOUT COMPLICATION: ICD-10-CM

## 2024-04-03 DIAGNOSIS — K58.9 IRRITABLE BOWEL SYNDROME WITHOUT DIARRHEA: ICD-10-CM

## 2024-04-03 DIAGNOSIS — R14.0 BLOATING: ICD-10-CM

## 2024-04-03 DIAGNOSIS — Z87.19 HISTORY OF COLITIS: ICD-10-CM

## 2024-04-03 DIAGNOSIS — K57.90 DIVERTICULOSIS: ICD-10-CM

## 2024-04-03 DIAGNOSIS — K58.1 IRRITABLE BOWEL SYNDROME WITH CONSTIPATION: ICD-10-CM

## 2024-04-03 DIAGNOSIS — R10.30 LOWER ABDOMINAL PAIN: ICD-10-CM

## 2024-04-03 DIAGNOSIS — K64.4 SKIN TAG OF PERIANAL REGION: ICD-10-CM

## 2024-04-03 PROCEDURE — 99214 OFFICE O/P EST MOD 30 MIN: CPT | Performed by: INTERNAL MEDICINE

## 2024-04-03 RX ORDER — FAMOTIDINE 20 MG/1
1 TABLET AT BEDTIME TABLET, FILM COATED ORAL ONCE A DAY
Qty: 30 | Refills: 3 | Status: ACTIVE | COMMUNITY
Start: 2024-01-29

## 2024-04-03 RX ORDER — OMEPRAZOLE 10 MG/1
1 CAPSULE 30 MINUTES BEFORE MORNING MEAL CAPSULE, DELAYED RELEASE ORAL ONCE A DAY
Status: ACTIVE | COMMUNITY

## 2024-04-03 RX ORDER — LUBIPROSTONE 8 UG/1
1 CAPSULE WITH FOOD AND WATER CAPSULE, GELATIN COATED ORAL TWICE A DAY
Qty: 60 | Refills: 3 | OUTPATIENT
Start: 2024-04-03 | End: 2024-08-01

## 2024-04-03 RX ORDER — ESTRADIOL 0.05 MG/D
1 PATCH TO SKIN PATCH TRANSDERMAL
Status: ACTIVE | COMMUNITY

## 2024-04-03 RX ORDER — ATORVASTATIN CALCIUM 20 MG/1
TAKE ONE TABLET BY MOUTH ONE TIME DAILY TABLET, FILM COATED ORAL
Qty: 30 UNSPECIFIED | Refills: 6 | Status: DISCONTINUED | COMMUNITY

## 2024-04-03 RX ORDER — AMLODIPINE BESYLATE 5 MG/1
1 TABLET TABLET ORAL ONCE A DAY
Status: ACTIVE | COMMUNITY

## 2024-04-03 NOTE — HPI-TODAY'S VISIT:
LV 1/2024. Eval was for years of lower abd pain, cramping, constipation/diarrhea, gas, distention. ER visit for lower abd pain in 2019 with CMP largely normal (slightly elevated AST), normal Hgb. CT in 2019 with colitis involving the descending colon. CT 3/6/21 with diverticulosis but no bowel inflammation. Celiac/wheat ab testing negative. Placed on bowel regimen and beano. Hpylori breath test negative. Treated with levsin as well. She was having 3-4 BM per day. Occasionally had nausea, issues with red meat. EGD/colon in 2018 with [facility]. No emesis. Was still getting colic, but better now that she is going to the bathroom, cramping resolves with defecation, suggestive of IBS. Uses simethicone pretty frequently, and this relieves her. Overall, symptoms suggestive of IBS-C, better now that she is going more regularly. Wanted to get food allergy testing and SIBO testing. Levsin prn, and possibly gluten free trial. Never did food allergy testing, no SIBO, and did not get records. In 2022, lots of pain, some diarrhea, vomiting, intolerances to everything she eats. Lots of abdominal cramping, similar to prior. Was having reflux, regurgitation, heartburn, sluggish in the esophagus. On omeprazole, dicyclomine all the time. Bentyl didnt help. Cycling diarrhea and constipation. Taking benefiber daily, no laxatives. Colonoscopy February 2023 demonstrated perianal skin tags, tortuous left colon, mild to moderate diverticulosis sigmoid and distal descending, normal terminal ileum, small nonbleeding internal hemorrhoids with otherwise normal exam and pathology demonstrating no inflammatory changes.  EGD February 2023 demonstrating a small 2 cm hernia, variable Z-line, erythema in the antrum, and otherwise normal duodenum with biopsies negative for celiac disease negative for H. pylori and negative for Rocha's esophagus.  I advised that she continue with MiraLAX and Benefiber as well as Levsin as needed for abdominal cramping.  Advised to follow-up if symptoms were to recur or persist. She was going better to the bathroom, taking papaya, Benefiber as well. Doing better from this regard. She is not having much colic at this point, so much better now. She felt a little ball in her anus a few weeks ago. Otherwise improved. At last visit, I did think that the abnormal sensation in the perianal area was related to skin tags which were pretty large.  I advised colorectal surgery referral to get that removed.  She mentioned to me that she was having a surgery in [place] for vaginal prolapse and so was going to get this taken care of and see me in follow-up. She did have bladder prolapse surgery in October 2023. No skin tags removed. Still has some frequency as far as urine but at night, not much during the day. This was being treated by urology. She does take benefiber and fruits and goes with this, but with some straining. She did have BM with bleeding recently. Treated with vaseline. This resolved. Do feel that she had hemorrhoidal injury from constipation, harder stools. Will add miralax to her benefiber to help with this. No further testing needed, she is improved. Will send hydrocortisone supps as needed. She is also having reflux symptoms more recently, no dysphagia, no alarm symptoms. Will do anti-reflux symptoms, famotidine 40 mg at night as well.  Since last visit, the patient had an ER visit at the end of March 2024.  Labs demonstrated a creatinine 0.6, normal LFTs, lipase 159, white count 13.8 with a hemoglobin of 13.2 and a platelet count of 474.  Chest x-ray was normal.  CT of the abdomen pelvis without contrast demonstrated mild pulmonary edema with no pleural effusion small hiatal hernia, hepatic steatosis, no calcified stones in the gallbladder, diverticulosis coli without any bowel inflammation, no lymphadenopathy, small fat filled umbilical hernia.  She tells me that she had a syncopal episode, went to hospital and had nausea, vomiting, diarrhea syndrome. Tells me she had a CT scan of the head which was negative. Was having cough and did have the flu 1 week prior to presentation. She was constipated, and had taken dulcolax but didnt work.

## 2024-05-21 NOTE — ASU PATIENT PROFILE, ADULT - AS SC BRADEN ACTIVITY
Patient called back to schedule surgery. Writer let patient know per Karen that she will be reaching out next week. Patient would like to know if there is any other doctor that can accommodate on 5/30. Please advise.   
Writer contacted patient to let her know surgery will be cancelled next week. 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 Writer will follow up at end of the week.  
(4) walks frequently

## 2024-09-11 NOTE — H&P PST ADULT - FUNCTIONAL STATUS
Spine Surgery Evaluation     I had the opportunity to consult with Shey Sagastume at [facility] request for spine evaluation.    Chief Complaint:    Chief Complaint   Patient presents with    Office Visit    New Patient     Ref by Dr. Chapman     Back Pain        History of Present Illness:  Shey Sagastume is a 88 year old female presenting to the clinic for evaluation of lumbar spine. History of 3 prior lumbar surgeries (laminectomy in 1973, 2005, right L4-5 hemilaminectomy in 2016 by Dr Manuel). She has chronic low back and right leg pain. Pain increased signficantly early July and she was admitted to the hospital. She was at a rehab facility and also had home PT. Overall, she notes intermittent improvement in symptoms. She is taking medications with some relief. She notes right leg numbness, weakness at times. Denies changes to bowel/bladder habits or saddle anesthesia. She was scheduled for caudal PAT with Dr Julien on 7/25/24 but this was cancelled by the patient. She states she is not interested in injections as they did not provide relief in past. Here today with her son.       Symptoms began: July 5  Inciting event:  none  Work related:  No.   Feels presenting complaint is: staying the same/ unchanged   Associated Symptoms: weakness, numbness/tingling , and balance issues   Pain location: low back and right leg   Pain at rest: 7  Pain with activity: 10   Quality: sharp , dull, throbbing, and numbness/tingling  Duration: comes and goes   Previous spine surgery:  lumbar laminectomy 1973, 2005; right L4-5 hemilaminectomy by Dr Manuel in 2016        Allergies:  ALLERGIES:   Allergen Reactions    Chlorhexidine ERYTHEMA    Prednisone CARDIAC DISTURBANCES     Increased heart rate    Penicillins HIVES     hives    Allergy RASH     Bleach    Chlorhexidine Gluconate Cloth PRURITUS    Doxycycline Other (See Comments)     Flushing, head pounding.    Naproxen GI UPSET     stomach pains    Sulfa Antibiotics  HEADACHES and SWELLING     gen. erythema, ha       Medications:  Current Outpatient Medications   Medication Sig Dispense Refill    metoPROLOL tartrate (LOPRESSOR) 25 MG tablet Take 1 tablet by mouth every 12 hours. 180 tablet 3    metFORMIN (GLUCOPHAGE-XR) 500 MG 24 hr tablet Take 1 tablet by mouth daily (with breakfast). 90 tablet 3    nystatin (MYCOSTATIN) 375243 UNIT/GM powder APPLY TOPICALLY UNDER BREAST AREA DAILY WHEN MOIST 60 g 1    meloxicam (MOBIC) 7.5 MG tablet Take 1 tablet by mouth daily. 90 tablet 1    zolpidem (AMBIEN) 5 MG tablet TAKE 1 TABLET BY MOUTH IN THE EVENING AS NEEDED FOR SLEEP 30 tablet 1    traMADol HCl 25 MG Tab Take 25 mg by mouth every 8 hours as needed (Moderate level Pain). (Patient not taking: Reported on 9/11/2024) 20 tablet 0    cyclobenzaprine (FLEXERIL) 5 MG tablet Take 1 tablet by mouth 3 times daily as needed for Muscle spasms (back Pain). (Patient not taking: Reported on 9/11/2024) 30 tablet 0    gabapentin (NEURONTIN) 300 MG capsule Take 1 capsule by mouth in the morning and 1 capsule in the evening.      acetaminophen (TYLENOL) 500 MG tablet Take 500 mg by mouth every 6 hours as needed for Pain.      cholecalciferol (VITAMIN D) 25 mcg(1,000 units) tablet Take 25 mcg by mouth daily.      simvastatin (ZOCOR) 20 MG tablet TAKE 1 TABLET BY MOUTH DAILY 90 tablet 1    nortriptyline (PAMELOR) 10 MG capsule TAKE ONE CAPSULE BY MOUTH EVERY EVENING 90 capsule 0    lisinopril-hydroCHLOROthiazide (ZESTORETIC) 10-12.5 MG per tablet TAKE 1 TABLET BY MOUTH DAILY 90 tablet 1    Docusate Calcium (STOOL SOFTENER PO) Take 1 capsule by mouth 2 times daily as needed (Constipation).       No current facility-administered medications for this visit.      Past Medical History:  Past Medical History:   Diagnosis Date    Arthritis     AV node arrhythmia     hx of tachy ez heart rate    Carpal tunnel syndrome 2007    Diverticulosis     Eczema     Essential hypertension, benign     Hypertension     Generalized osteoarthrosis, involving multiple sites     Osteoarthritis gen'l    Left Metatarsalgia 01/31/2007    Melanoma 02/18/2011    right cheek;Dr Mario Alberto Matute    Neuroforaminal stenosis of spine     Osteoporosis, unspecified 02/06/2009    Other and unspecified hyperlipidemia 09/03/2013    Other chronic sinusitis     PONV (postoperative nausea and vomiting)     Spondylosis     multi level lumbar L4-5    Symptomatic menopausal or female climacteric states     Menopausal Syndrome    Type II or unspecified type diabetes mellitus without mention of complication, not stated as uncontrolled 04/03/2013    Varicella without mention of complication 05/1998      Past Surgical History:  Past Surgical History:   Procedure Laterality Date    APPENDECTOMY      Age 14    COLONOSCOPY DIAGNOSTIC  01/17/2006    Dr. Brasher, Diverticulosis, F/U 10 years    EYE SURGERY      LAMINEC/FACETECT/FORAMIN,LUMBAR  04/28/2016    Right L4-5 lucinda-laminectomy/[facility] 4/28/16    LAMINECTOMY,LUMBAR  1973, 2005    Laminectomy lumbar    PPM DUAL GENERATOR REPLACE  08/19/2022    REMOVE TONSILS/ADENOIDS,<11 Y/O      T & A    SKIN BIOPSY      TONSILLECTOMY AND ADENOIDECTOMY      TOTAL ABDOM HYSTERECTOMY  01/01/1980    NOÉ w BSO      Family History:  Family History   Problem Relation Age of Onset    Cancer Mother         breast    Ophthalmology Brother         strabismus    Cancer Sister       Social History:  Social History     Socioeconomic History    Marital status:      Spouse name: Not on file    Number of children: 2    Years of education: Not on file    Highest education level: Not on file   Occupational History    Occupation: Retired     Comment: [employer]     Employer: [employer]   Tobacco Use    Smoking status: Never    Smokeless tobacco: Never   Vaping Use    Vaping status: never used   Substance and Sexual Activity    Alcohol use: No     Comment: none    Drug use: No    Sexual activity: Not Currently     Partners: Male    Other Topics Concern    Not on file   Social History Narrative    Not on file     Social Determinants of Health     Financial Resource Strain: Low Risk  (7/5/2024)    Financial Resource Strain     Unable to Get: None   Food Insecurity: Low Risk  (7/5/2024)    Food Insecurity     Worried about Food: Never true     Food is Gone: Never true   Transportation Needs: No Transportation Needs (8/22/2024)    OASIS : Transportation     Lack of Transportation (Medical): No     Lack of Transportation (Non-Medical): No     Patient Unable or Declines to Respond: No   Physical Activity: Not on file   Stress: Not on file   Social Connections: Feeling Socially Integrated (8/22/2024)    OASIS : Social Isolation     Frequency of experiencing loneliness or isolation: Never   Interpersonal Safety: Low Risk  (7/5/2024)    Interpersonal Safety     How often physically hurt: Never     How often insulted or talked down to: Never     How often threatened with harm: Never     How often scream or curse at: Never        Review of Systems:   12 point review of symptoms obtained. Unremarkable except as noted in HPI.     Physical Exam:  Vitals: Visit Vitals  Resp 16   Ht 5' 1\" (1.549 m)   Wt 82.6 kg (182 lb)   LMP 01/31/1970   BMI 34.39 kg/m²      General: Well developed, nourished, Obese body habitus, comfortable appearing  Psych: Alert and Oriented x3, appropriate affect  Head: Normal cephalic, a-traumatic    Respiratory: Non-labored breathing  Cardiovascular: No peripheral edema  Vascular: warm, well perfused  Gait:  antalgic  Thoraco-Lumbar Spine/Pelvis: healed incision    Motor:  Lower Extremity    Left Right   Iliopsoas 5/5 4/5   Quadriceps 5/5 4/5   Hamstrings 5/5 4/5   Tibialis anterior 5/5 4/5   Extensor hallucis longus 5/5 4/5   Gastrocnemeus/  soleus 5/5 4/5     Sensation:  Lower Extremity     Left Right   L1 Inguinal ligamnet intact intact   L2 Medial thigh intact intact   L3 Medial thigh intact intact   L4 Medial foot  intact decreased   L5 Web space big toe/2nd toe intact decreased   S1 Lateral foot intact decreased       Provocative:   Tension Signs: + Right SLR and - Left SLR    Documentation Reviewed:   Referring provider's documentation     Independent Interpretation of Diagnostic Tests/Imaging:  Diagnostic tests/ imaging personally reviewed and discussed with patient:     9/11/24  Lumbar AP, lateral, flexion, extension x-rays  7/5/24 CT lumbar spine     Assessment and Medical Decision Making:    The primary encounter diagnosis was Lumbar spondylosis. Diagnoses of Lumbar radiculopathy, Compression fracture of thoracic vertebra, unspecified thoracic vertebral level, initial encounter  (CMD), and Spondylolisthesis of lumbar region were also pertinent to this visit.    88 year old female with lumbar spondylosis, spondylolisthesis, right lumbar radiculopathy; possible lower thoracic compression fracture. Patient with acute on chronic low back pain with radiation to right leg with associated paresthesias, weakness that started July 5. History of right L4-5 hemilaminectomy by Dr Manuel in 2016; two prior lumbar laminectomies in 1973 and 2005. She has tried home PT, medications by mouth. She was scheduled for caudal PAT with Dr Julien but cancelled as she has not had relief with injections in the past. Imaging reviewed with patient and son. Demonstrates L4-5 spondylolisthesis with diffuse disc degeneration. Questionable lower thoracic compression fracture (likely T10) on xray today when compared to CT lumbar. Moderate to severe central stenosis L4-5. Discussed multiple treatment options. Current recommendation is MRI thoracic and lumbar. Patient with pacemaker, will need to have MRI completed at [facility] per protocol. Based on imaging results, may benefit from spinal injections. If MRI demonstrates acute compression fracture, may benefit from cement augmentation. Patient and son verbalized understanding of plan.         Instructions:  Instructed the patient that in the event that symptoms change to contact the office or present to the local emergency room     I appreciate your consideration in allowing me to participate in your patient's care and will keep you updated on their progress. Please feel free to call me at anytime if you have any questions.    Thank you for the opportunity to participate in the care of this patient.     CC: Mohinder Chapman MD    On 9/11/2024, IApril PA-C scribed the services personally performed by Dr. Aguirre     The documentation recorded by the scribe accurately and completely reflects the service(s) I personally performed and the decisions made by me.     Juan Aguirre MD     4-10 METS

## 2024-09-23 ENCOUNTER — APPOINTMENT (OUTPATIENT)
Dept: OBGYN | Facility: CLINIC | Age: 69
End: 2024-09-23

## 2024-11-14 ENCOUNTER — APPOINTMENT (OUTPATIENT)
Dept: OBGYN | Facility: CLINIC | Age: 69
End: 2024-11-14
Payer: COMMERCIAL

## 2024-11-14 VITALS — DIASTOLIC BLOOD PRESSURE: 89 MMHG | SYSTOLIC BLOOD PRESSURE: 155 MMHG | BODY MASS INDEX: 29.23 KG/M2 | WEIGHT: 165 LBS

## 2024-11-14 PROCEDURE — 99024 POSTOP FOLLOW-UP VISIT: CPT

## 2025-05-29 ENCOUNTER — OFFICE VISIT (OUTPATIENT)
Dept: URBAN - METROPOLITAN AREA CLINIC 7 | Facility: CLINIC | Age: 70
End: 2025-05-29
Payer: MEDICARE

## 2025-05-29 ENCOUNTER — DASHBOARD ENCOUNTERS (OUTPATIENT)
Age: 70
End: 2025-05-29

## 2025-05-29 DIAGNOSIS — K58.9 IRRITABLE BOWEL SYNDROME WITHOUT DIARRHEA: ICD-10-CM

## 2025-05-29 DIAGNOSIS — K57.90 DIVERTICULOSIS: ICD-10-CM

## 2025-05-29 DIAGNOSIS — Z87.19 HISTORY OF COLITIS: ICD-10-CM

## 2025-05-29 DIAGNOSIS — K64.8 INTERNAL HEMORRHOIDS WITHOUT COMPLICATION: ICD-10-CM

## 2025-05-29 DIAGNOSIS — R14.0 BLOATING: ICD-10-CM

## 2025-05-29 DIAGNOSIS — R10.30 LOWER ABDOMINAL PAIN: ICD-10-CM

## 2025-05-29 DIAGNOSIS — K21.9 GERD (GASTROESOPHAGEAL REFLUX DISEASE): ICD-10-CM

## 2025-05-29 DIAGNOSIS — K58.1 IRRITABLE BOWEL SYNDROME WITH CONSTIPATION: ICD-10-CM

## 2025-05-29 DIAGNOSIS — K64.4 SKIN TAG OF PERIANAL REGION: ICD-10-CM

## 2025-05-29 PROCEDURE — 99214 OFFICE O/P EST MOD 30 MIN: CPT | Performed by: INTERNAL MEDICINE

## 2025-05-29 RX ORDER — OMEPRAZOLE 10 MG/1
1 CAPSULE 30 MINUTES BEFORE MORNING MEAL CAPSULE, DELAYED RELEASE ORAL ONCE A DAY
Status: ACTIVE | COMMUNITY

## 2025-05-29 RX ORDER — ESTRADIOL 0.05 MG/D
1 PATCH TO SKIN PATCH TRANSDERMAL
Status: ACTIVE | COMMUNITY

## 2025-05-29 RX ORDER — AMLODIPINE BESYLATE 5 MG/1
1 TABLET TABLET ORAL ONCE A DAY
Status: ACTIVE | COMMUNITY

## 2025-05-29 RX ORDER — FAMOTIDINE 20 MG/1
1 TABLET AT BEDTIME TABLET, FILM COATED ORAL TWICE A DAY
Qty: 60 TABLET | Refills: 3 | Status: ACTIVE | COMMUNITY
Start: 2024-01-29

## 2025-05-29 NOTE — HPI-TODAY'S VISIT:
LV 4/2024. Eval was for years of lower abd pain, cramping, constipation/diarrhea, gas, distention. ER visit for lower abd pain in 2019 with CMP largely normal (slightly elevated AST), normal Hgb. CT in 2019 with colitis involving the descending colon. CT 3/6/21 with diverticulosis but no bowel inflammation. Celiac/wheat ab testing negative. Placed on bowel regimen and beano. Hpylori breath test negative. Treated with levsin as well. She was having 3-4 BM per day. Occasionally had nausea, issues with red meat. EGD/colon in 2018 with [facility]. No emesis. Was still getting colic, but better now that she is going to the bathroom, cramping resolves with defecation, suggestive of IBS. Uses simethicone pretty frequently, and this relieves her. Overall, symptoms suggestive of IBS-C, better now that she is going more regularly. Wanted to get food allergy testing and SIBO testing. Levsin prn, and possibly gluten free trial. Never did food allergy testing, no SIBO, and did not get records. In 2022, lots of pain, some diarrhea, vomiting, intolerances to everything she eats. Lots of abdominal cramping, similar to prior. Was having reflux, regurgitation, heartburn, sluggish in the esophagus. On omeprazole, dicyclomine all the time. Bentyl didnt help. Cycling diarrhea and constipation. Taking benefiber daily, no laxatives. Colonoscopy February 2023 demonstrated perianal skin tags, tortuous left colon, mild to moderate diverticulosis sigmoid and distal descending, normal terminal ileum, small nonbleeding internal hemorrhoids with otherwise normal exam and pathology demonstrating no inflammatory changes.  EGD February 2023 demonstrating a small 2 cm hernia, variable Z-line, erythema in the antrum, and otherwise normal duodenum with biopsies negative for celiac disease negative for H. pylori and negative for Rocha's esophagus.  I advised that she continue with MiraLAX and Benefiber as well as Levsin as needed for abdominal cramping.  Advised to follow-up if symptoms were to recur or persist. She was going better to the bathroom, taking papaya, Benefiber as well. Doing better from this regard. She is not having much colic at this point, so much better now. She felt a little ball in her anus a few weeks ago. Otherwise improved. At last visit, I did think that the abnormal sensation in the perianal area was related to skin tags which were pretty large.  I advised colorectal surgery referral to get that removed.  She mentioned to me that she was having a surgery in [place] for vaginal prolapse and so was going to get this taken care of and see me in follow-up. She did have bladder prolapse surgery in October 2023. No skin tags removed. Still has some frequency as far as urine but at night, not much during the day. This was being treated by urology. She does take benefiber and fruits and goes with this, but with some straining. She did have BM with bleeding recently. Treated with vaseline. This resolved. Do feel that she had hemorrhoidal injury from constipation, harder stools. Will add miralax to her benefiber to help with this. No further testing needed, she is improved. Will send hydrocortisone supps as needed. She is also having reflux symptoms more recently, no dysphagia, no alarm symptoms. Will do anti-reflux symptoms, famotidine 40 mg at night as well. Then had an ER visit at the end of March 2024.  Labs demonstrated a creatinine 0.6, normal LFTs, lipase 159, white count 13.8 with a hemoglobin of 13.2 and a platelet count of 474.  Chest x-ray was normal.  CT of the abdomen pelvis without contrast demonstrated mild pulmonary edema with no pleural effusion small hiatal hernia, hepatic steatosis, no calcified stones in the gallbladder, diverticulosis coli without any bowel inflammation, no lymphadenopathy, small fat filled umbilical hernia. She tells me that she had a syncopal episode, went to hospital and had nausea, vomiting, diarrhea syndrome. Tells me she had a CT scan of the head which was negative. Was having cough and did have the flu 1 week prior to presentation. She was constipated, and had taken dulcolax but didnt work. Did feel that she had an acute gastroenteritis, possibly from preceding viral infection, and had a vagal event as a result. She continues to have issues with constipation and abdominal colic as a result. Will add back Miralax once daily, in combination with lubiprostone 8 mcg BID. Lost to FU since then. Recent CBC, CMP normal.  She developed bad constipation around the time of her knee replacement, and had bleeding symptoms. She is better now. She tells me she called multiple times to see me, and came to the office, but I never received messages regarding this. She tells me she never started on lubiprostone. Taking prune juice now and better BMs, daily. No further bleeding.

## 2025-06-02 ENCOUNTER — APPOINTMENT (OUTPATIENT)
Dept: URBAN - METROPOLITAN AREA CLINIC 148 | Facility: CLINIC | Age: 70
Setting detail: DERMATOLOGY
End: 2025-06-02

## 2025-06-02 DIAGNOSIS — L82.1 OTHER SEBORRHEIC KERATOSIS: ICD-10-CM

## 2025-06-02 DIAGNOSIS — L65.0 TELOGEN EFFLUVIUM: ICD-10-CM

## 2025-06-02 PROBLEM — L65.9 NONSCARRING HAIR LOSS, UNSPECIFIED: Status: ACTIVE | Noted: 2025-06-02

## 2025-06-02 PROCEDURE — ? COUNSELING

## 2025-06-02 PROCEDURE — 99204 OFFICE O/P NEW MOD 45 MIN: CPT

## 2025-06-02 PROCEDURE — ? PRESCRIPTION MEDICATION MANAGEMENT

## 2025-06-02 PROCEDURE — ? PRESCRIPTION

## 2025-06-02 RX ORDER — FLUOCINONIDE 0.5 MG/ML
SOLUTION TOPICAL
Qty: 60 | Refills: 1 | Status: ERX | COMMUNITY
Start: 2025-06-02

## 2025-06-02 RX ADMIN — FLUOCINONIDE: 0.5 SOLUTION TOPICAL at 00:00

## 2025-06-02 ASSESSMENT — LOCATION SIMPLE DESCRIPTION DERM
LOCATION SIMPLE: POSTERIOR SCALP
LOCATION SIMPLE: CHEST

## 2025-06-02 ASSESSMENT — LOCATION ZONE DERM
LOCATION ZONE: TRUNK
LOCATION ZONE: SCALP

## 2025-06-02 ASSESSMENT — LOCATION DETAILED DESCRIPTION DERM
LOCATION DETAILED: POSTERIOR MID-PARIETAL SCALP
LOCATION DETAILED: LEFT MEDIAL SUPERIOR CHEST

## 2025-06-02 NOTE — PROCEDURE: PRESCRIPTION MEDICATION MANAGEMENT
Initiate Treatment: Fluocinonide scalp solution
Render In Strict Bullet Format?: No
Plan: Recommended Vitamins to help with hair loss. Consider blood work if no improvement
Detail Level: Zone

## 2025-06-02 NOTE — HPI: HAIR LOSS
Previous Labs: Yes
How Did The Hair Loss Occur?: sudden in onset
How Severe Is Your Hair Loss?: mild
When Were The Labs Drawn? (Drawn...): April

## 2025-08-25 ENCOUNTER — OFFICE VISIT (OUTPATIENT)
Dept: URBAN - METROPOLITAN AREA CLINIC 7 | Facility: CLINIC | Age: 70
End: 2025-08-25

## 2025-08-25 ENCOUNTER — TELEPHONE ENCOUNTER (OUTPATIENT)
Dept: URBAN - METROPOLITAN AREA CLINIC 7 | Facility: CLINIC | Age: 70
End: 2025-08-25

## 2025-08-25 RX ORDER — OMEPRAZOLE 10 MG/1
1 CAPSULE 30 MINUTES BEFORE MORNING MEAL CAPSULE, DELAYED RELEASE ORAL ONCE A DAY
Status: ACTIVE | COMMUNITY

## 2025-08-25 RX ORDER — AMLODIPINE BESYLATE 5 MG/1
1 TABLET TABLET ORAL ONCE A DAY
Status: ACTIVE | COMMUNITY

## 2025-08-25 RX ORDER — FAMOTIDINE 20 MG/1
1 TABLET AT BEDTIME TABLET, FILM COATED ORAL TWICE A DAY
Qty: 60 TABLET | Refills: 3 | Status: ACTIVE | COMMUNITY
Start: 2024-01-29

## 2025-08-25 RX ORDER — ESTRADIOL 0.05 MG/D
1 PATCH TO SKIN PATCH TRANSDERMAL
Status: ACTIVE | COMMUNITY

## (undated) DEVICE — RUMI KOH-EFFICIENT 3.0CM

## (undated) DEVICE — FOLEY TRAY 16FR 5CC LTX UMETER CLOSED

## (undated) DEVICE — VENODYNE/SCD SLEEVE CALF LARGE

## (undated) DEVICE — SUT VLOC 90 2-0 6" GS-22 UNDYED

## (undated) DEVICE — SOL IRR POUR NS 0.9% 500ML

## (undated) DEVICE — DRAPE MAYO STAND 30"

## (undated) DEVICE — SUT VICRYL PLUS 4-0 27" PS-2 UNDYED

## (undated) DEVICE — BLADE SCALPEL SAFETYLOCK #15

## (undated) DEVICE — XI DRAPE COLUMN

## (undated) DEVICE — DRAPE LIGHT HANDLE COVER (BLUE)

## (undated) DEVICE — APPLICATOR SURGICEL LAP TROCAR POINT 2.5MM X 150MM

## (undated) DEVICE — Device

## (undated) DEVICE — DRAPE BACK TABLE COVER HEAVY DUTY 60"

## (undated) DEVICE — SYR LUER LOK 10CC

## (undated) DEVICE — LUBRICATING JELLY ONESHOT 1.25OZ

## (undated) DEVICE — RUMI TIP BLUE 6.7MM X 8CM

## (undated) DEVICE — XI OBTURATOR OPTICAL BLADELESS 8MM

## (undated) DEVICE — SUT VLOC 90 2-0 9" GS-22 UNDYED

## (undated) DEVICE — MEDICATION LABELS W MARKER

## (undated) DEVICE — XI DRAPE ARM

## (undated) DEVICE — ELCTR BOVIE PENCIL SMOKE EVACUATION

## (undated) DEVICE — RUMI KOH-EFFICIENT 2.5CM

## (undated) DEVICE — POSITIONER PURPLE ARM ONE STEP (LARGE)

## (undated) DEVICE — PACK ROBOTIC LIJ

## (undated) DEVICE — RUMI TIP ORANGE 6.7MM X 12CM

## (undated) DEVICE — BLADE SCALPEL SAFETYLOCK #10

## (undated) DEVICE — VISITEC 4X4

## (undated) DEVICE — TUBING SUCTION 20FT

## (undated) DEVICE — SUT VLOC 180 2-0 12" GS-21 GREEN

## (undated) DEVICE — RUMI KOH-EFFICIENT 3.5CM

## (undated) DEVICE — SUT ETHIBOND 2-0 30" SH

## (undated) DEVICE — STAPLER SKIN VISI-STAT 35 WIDE

## (undated) DEVICE — DRAPE LAVH 124" X 30" X125"

## (undated) DEVICE — DRAPE TOWEL BLUE 17" X 24"

## (undated) DEVICE — POSITIONER PINK PAD PIGAZZI SYSTEM

## (undated) DEVICE — TROCAR SURGIQUEST AIRSEAL 12MMX100MM

## (undated) DEVICE — TUBING TUR 2 PRONG

## (undated) DEVICE — DRSG OPSITE 13.75 X 4"

## (undated) DEVICE — LUBRICANT INST ELECTROLUBE Z SOLUTION

## (undated) DEVICE — TUBING AIRSEAL TRI-LUMEN FILTERED

## (undated) DEVICE — DRAPE 3/4 SHEET W REINFORCEMENT 56X77"

## (undated) DEVICE — XI SEAL UNIV 5- 8 MM

## (undated) DEVICE — SUT VICRYL PLUS 2-0 27" CT-3

## (undated) DEVICE — TIP HOYTE SACROCOLPOPEXY SM

## (undated) DEVICE — XI ARM GRASPER TIP UP FENESTRATED

## (undated) DEVICE — SOL IRR POUR H2O 250ML

## (undated) DEVICE — RUMI TIP WHITE 6.7MM X 6CM

## (undated) DEVICE — LAP PAD 18 X 18"

## (undated) DEVICE — TUBING STRYKEFLOW II SUCTION / IRRIGATOR

## (undated) DEVICE — BLADE SCALPEL SAFETYLOCK #11

## (undated) DEVICE — SUCTION YANKAUER NO CONTROL VENT

## (undated) DEVICE — SUT VLOC 180 3-0 6" V-20 GREEN

## (undated) DEVICE — GLV 7.5 PROTEXIS (WHITE)

## (undated) DEVICE — ENDOCATCH II 15MM

## (undated) DEVICE — DRSG MASTISOL

## (undated) DEVICE — SPECIMEN CONTAINER 100ML

## (undated) DEVICE — XI TIP COVER

## (undated) DEVICE — ENDOCATCH 10MM SPECIMEN POUCH

## (undated) DEVICE — LIGASURE BLUNT TIP 37CM

## (undated) DEVICE — SYR ASEPTO

## (undated) DEVICE — WARMING BLANKET UPPER ADULT

## (undated) DEVICE — D HELP - CLEARVIEW CLEARIFY SYSTEM

## (undated) DEVICE — DRSG STERISTRIPS 0.5 X 4"

## (undated) DEVICE — SOL INJ NS 0.9% 500ML 1-PORT

## (undated) DEVICE — PREP CHLORAPREP HI-LITE ORANGE 26ML

## (undated) DEVICE — SUT VLOC 180 3-0 9" V-20 GREEN

## (undated) DEVICE — XI VESSEL SEALER

## (undated) DEVICE — TIP HOYTE SACROCOLPOPEXY LRG

## (undated) DEVICE — DRAPE 1/2 SHEET 40X57"

## (undated) DEVICE — SUT VLOC 90 2-0 6" GS-22 VIOLET

## (undated) DEVICE — RUMI KOH-EFFICIENT 4.0CM

## (undated) DEVICE — MARKING PEN W RULER

## (undated) DEVICE — APPLICATOR FOR ARISTA XL 38CM

## (undated) DEVICE — RUMI TIP GREEN 6.7MM X 10CM

## (undated) DEVICE — NDL HYPO REGULAR BEVEL 22G X 1.5" (TURQUOISE)